# Patient Record
Sex: FEMALE | Race: WHITE | URBAN - METROPOLITAN AREA
[De-identification: names, ages, dates, MRNs, and addresses within clinical notes are randomized per-mention and may not be internally consistent; named-entity substitution may affect disease eponyms.]

---

## 2017-06-07 LAB — RAPID GROUP A STREP (OHS): NEGATIVE

## 2022-04-11 ENCOUNTER — HISTORICAL (OUTPATIENT)
Dept: ADMINISTRATIVE | Facility: HOSPITAL | Age: 27
End: 2022-04-11

## 2022-04-28 VITALS
OXYGEN SATURATION: 98 % | HEIGHT: 66 IN | BODY MASS INDEX: 19.67 KG/M2 | SYSTOLIC BLOOD PRESSURE: 111 MMHG | WEIGHT: 122.38 LBS | DIASTOLIC BLOOD PRESSURE: 70 MMHG

## 2022-09-21 ENCOUNTER — HISTORICAL (OUTPATIENT)
Dept: ADMINISTRATIVE | Facility: HOSPITAL | Age: 27
End: 2022-09-21

## 2024-05-02 ENCOUNTER — OFFICE VISIT (OUTPATIENT)
Dept: URGENT CARE | Facility: CLINIC | Age: 29
End: 2024-05-02
Payer: COMMERCIAL

## 2024-05-02 VITALS
OXYGEN SATURATION: 99 % | HEIGHT: 60 IN | RESPIRATION RATE: 20 BRPM | SYSTOLIC BLOOD PRESSURE: 104 MMHG | BODY MASS INDEX: 23.95 KG/M2 | TEMPERATURE: 101 F | DIASTOLIC BLOOD PRESSURE: 69 MMHG | HEART RATE: 126 BPM | WEIGHT: 122 LBS

## 2024-05-02 DIAGNOSIS — J02.9 SORE THROAT: Primary | ICD-10-CM

## 2024-05-02 DIAGNOSIS — B27.90 INFECTIOUS MONONUCLEOSIS WITHOUT COMPLICATION, INFECTIOUS MONONUCLEOSIS DUE TO UNSPECIFIED ORGANISM: ICD-10-CM

## 2024-05-02 LAB
CTP QC/QA: YES
CTP QC/QA: YES
HETEROPH AB SER QL: POSITIVE
S PYO RRNA THROAT QL PROBE: NEGATIVE

## 2024-05-02 PROCEDURE — 87880 STREP A ASSAY W/OPTIC: CPT | Mod: QW,,, | Performed by: STUDENT IN AN ORGANIZED HEALTH CARE EDUCATION/TRAINING PROGRAM

## 2024-05-02 PROCEDURE — 86308 HETEROPHILE ANTIBODY SCREEN: CPT | Mod: QW,,, | Performed by: STUDENT IN AN ORGANIZED HEALTH CARE EDUCATION/TRAINING PROGRAM

## 2024-05-02 PROCEDURE — 99204 OFFICE O/P NEW MOD 45 MIN: CPT | Mod: S$GLB,,, | Performed by: STUDENT IN AN ORGANIZED HEALTH CARE EDUCATION/TRAINING PROGRAM

## 2024-05-02 RX ORDER — AMOXICILLIN AND CLAVULANATE POTASSIUM 875; 125 MG/1; MG/1
1 TABLET, FILM COATED ORAL EVERY 12 HOURS
Qty: 20 TABLET | Refills: 0 | Status: SHIPPED | OUTPATIENT
Start: 2024-05-02 | End: 2024-05-02 | Stop reason: ALTCHOICE

## 2024-05-02 RX ORDER — BENZOCAINE/MENTHOL 15 MG-10MG
1 LOZENGE MUCOUS MEMBRANE
Qty: 20 LOZENGE | Refills: 0 | Status: SHIPPED | OUTPATIENT
Start: 2024-05-02

## 2024-05-02 RX ORDER — METHYLPREDNISOLONE 4 MG/1
TABLET ORAL
Qty: 21 EACH | Refills: 0 | Status: SHIPPED | OUTPATIENT
Start: 2024-05-02 | End: 2024-05-23

## 2024-05-02 RX ORDER — DEXTROAMPHETAMINE SACCHARATE, AMPHETAMINE ASPARTATE MONOHYDRATE, DEXTROAMPHETAMINE SULFATE AND AMPHETAMINE SULFATE 5; 5; 5; 5 MG/1; MG/1; MG/1; MG/1
20 CAPSULE, EXTENDED RELEASE ORAL EVERY MORNING
COMMUNITY

## 2024-05-02 RX ORDER — ACETAMINOPHEN 500 MG
1000 TABLET ORAL
Status: COMPLETED | OUTPATIENT
Start: 2024-05-02 | End: 2024-05-02

## 2024-05-02 RX ADMIN — Medication 1000 MG: at 05:05

## 2024-05-02 NOTE — PROGRESS NOTES
Subjective:      Patient ID: Mindy Diez is a 28 y.o. female.    Vitals:  blood pressure is 128/85 and her pulse is 86. Her respiration is 18 and oxygen saturation is 96%.     Chief Complaint: Sore Throat    Sore Throat   This is a new problem. The current episode started today. Associated symptoms comments: Fever . Treatments tried: ibuprofen.       HENT:  Positive for sore throat.       Objective:     Physical Exam    Assessment:     No diagnosis found.    Plan:       There are no diagnoses linked to this encounter.

## 2024-05-02 NOTE — PROGRESS NOTES
Subjective:      Patient ID: Mindy Diez is a 28 y.o. female.    Vitals:  height is 5' (1.524 m) and weight is 55.3 kg (122 lb). Her oral temperature is 101.5 °F (38.6 °C) (abnormal). Her blood pressure is 104/69 and her pulse is 126 (abnormal). Her respiration is 20 and oxygen saturation is 99%.     Chief Complaint: Sore Throat    Patient is a 28-year-old female who presents to clinic for evaluation of sore throat.  Patient reports concern for strep throat.  Patient reports symptoms began yesterday however worse today.  Patient states has developed some fatigue and fever today.  Patient reports fever with a temperature max of 101.7° F.  Patient reports she has taken Motrin prior to arrival of clinic.  Patient reports mildly painful swallowing however has not had any voice change, drooling, or difficulty speaking.  Patient denies any unilateral neck swelling.  Patient denies any headaches or dizziness, body aches, rash, ear pain, nasal sinus congestion, chest pain or shortness a breath, cough, abdominal pain, nausea or vomiting, diarrhea, urinary symptoms, or change in mentation.        Constitution: Positive for fatigue and fever (Temperature max 101.7° F).   HENT:  Positive for sore throat (Painful swallowing). Negative for ear pain, drooling and congestion.    Neck: neck negative.   Cardiovascular: Negative.  Negative for chest pain and palpitations.   Eyes: Negative.    Respiratory: Negative.  Negative for chest tightness, cough and shortness of breath.    Gastrointestinal: Negative.  Negative for abdominal pain, nausea, vomiting and diarrhea.   Endocrine: negative.   Genitourinary: Negative.  Negative for dysuria, frequency and urgency.   Musculoskeletal: Negative.    Skin: Negative.  Negative for color change, pale, rash and erythema.   Allergic/Immunologic: Negative.    Neurological: Negative.  Negative for dizziness, light-headedness, passing out, headaches, disorientation and altered mental status.    Hematologic/Lymphatic: Negative.    Psychiatric/Behavioral: Negative.  Negative for altered mental status, disorientation and confusion.       Objective:     Physical Exam   Constitutional: She is oriented to person, place, and time. She appears well-developed. She is cooperative.  Non-toxic appearance. She does not appear ill. No distress.   HENT:   Head: Normocephalic and atraumatic.   Ears:   Right Ear: Hearing, tympanic membrane, external ear and ear canal normal.   Left Ear: Hearing, tympanic membrane, external ear and ear canal normal.   Nose: Nose normal. No mucosal edema, rhinorrhea, nasal deformity or congestion. No epistaxis. Right sinus exhibits no maxillary sinus tenderness and no frontal sinus tenderness. Left sinus exhibits no maxillary sinus tenderness and no frontal sinus tenderness.   Mouth/Throat: Uvula is midline and mucous membranes are normal. Mucous membranes are moist. No trismus in the jaw. Normal dentition. No uvula swelling. Oropharyngeal exudate present. No posterior oropharyngeal erythema. Tonsils are 1+ on the right. Tonsils are 1+ on the left. Tonsillar exudate.   Eyes: Conjunctivae and lids are normal. Pupils are equal, round, and reactive to light. Right eye exhibits no discharge. Left eye exhibits no discharge. No scleral icterus.   Neck: Trachea normal and phonation normal. Neck supple. No neck rigidity present.   Cardiovascular: Regular rhythm, normal heart sounds and normal pulses. Tachycardia present.   Pulmonary/Chest: Effort normal and breath sounds normal. No respiratory distress. She has no wheezes. She has no rhonchi. She has no rales.   Abdominal: Normal appearance and bowel sounds are normal. She exhibits no distension. Soft. There is no abdominal tenderness.   Musculoskeletal: Normal range of motion.         General: Normal range of motion.      Cervical back: She exhibits no tenderness.   Lymphadenopathy:     She has cervical adenopathy.   Neurological: She is alert  and oriented to person, place, and time. She exhibits normal muscle tone.   Skin: Skin is warm, dry, intact, not diaphoretic, not pale and no rash. Capillary refill takes less than 2 seconds. No erythema   Psychiatric: Her speech is normal and behavior is normal. Judgment and thought content normal.   Nursing note and vitals reviewed.      Assessment:     1. Sore throat    2. Infectious mononucleosis without complication, infectious mononucleosis due to unspecified organism        Plan:       Sore throat  -     POCT rapid strep A  -     Strep A culture, throat  -     POCT Infectious mononucleosis antibody    Infectious mononucleosis without complication, infectious mononucleosis due to unspecified organism    Other orders  -     acetaminophen tablet 1,000 mg  -     Discontinue: amoxicillin-clavulanate 875-125mg (AUGMENTIN) 875-125 mg per tablet; Take 1 tablet by mouth every 12 (twelve) hours. for 10 days  Dispense: 20 tablet; Refill: 0  -     benzocaine-menthoL (CHLORASEPTIC MAX) 15-10 mg Lozg; 1 lozenge by Mucous Membrane route every 2 (two) hours as needed (Sore throat).  Dispense: 20 lozenge; Refill: 0  -     methylPREDNISolone (MEDROL DOSEPACK) 4 mg tablet; use as directed  Dispense: 21 each; Refill: 0                Labs:  Rapid strep negative.  Mono positive.  Throat culture collected; will call results.  Tylenol 1 g by mouth in clinic for fever.  Patient tolerated well.  No complications noted.  Repeat temperature 100.7° F.  Take medications as prescribed.  Tylenol/Motrin per package instructions for any pain or fever.  Recommend warm salt water gargles every 2-3 hours while awake.  Follow-up with PCP in 1-2 days.  Follow-up ENT as needed.  Return to clinic as needed.  To ED for any new or acutely worsening symptoms.  Patient in agreement with plan of care.    DISCLAIMER: Please note that my documentation in this Electronic Healthcare Record was produced using speech recognition software and therefore may  contain errors related to that software system.These could include grammar, punctuation and spelling errors or the inclusion/exclusion of phrases that were not intended. Garbled syntax, mangled pronouns, and other bizarre constructions may be attributed to that software system.

## 2024-05-02 NOTE — LETTER
May 2, 2024      [facility]  [address]  [facility] [address]  Phone: [phone]       Patient: Mindy Diez   YOB: 1995  Date of Visit: 05/02/2024    To Whom It May Concern:    BANDAR Diez  was at [facility] on 05/02/2024. The patient may return to work/school on 05/06/2024 with no restrictions. If you have any questions or concerns, or if I can be of further assistance, please do not hesitate to contact me.    Sincerely,    Americo Logan, NP

## 2024-05-07 ENCOUNTER — TELEPHONE (OUTPATIENT)
Dept: URGENT CARE | Facility: CLINIC | Age: 29
End: 2024-05-07
Payer: COMMERCIAL

## 2024-05-07 LAB — S PYO THROAT QL CULT: NEGATIVE

## 2024-10-09 ENCOUNTER — OFFICE VISIT (OUTPATIENT)
Dept: URGENT CARE | Facility: CLINIC | Age: 29
End: 2024-10-09
Payer: COMMERCIAL

## 2024-10-09 VITALS
RESPIRATION RATE: 18 BRPM | BODY MASS INDEX: 23.95 KG/M2 | WEIGHT: 122 LBS | OXYGEN SATURATION: 98 % | HEIGHT: 60 IN | SYSTOLIC BLOOD PRESSURE: 116 MMHG | DIASTOLIC BLOOD PRESSURE: 66 MMHG | HEART RATE: 102 BPM | TEMPERATURE: 98 F

## 2024-10-09 DIAGNOSIS — L60.0 INGROWN LEFT BIG TOENAIL: Primary | ICD-10-CM

## 2024-10-09 DIAGNOSIS — L03.032 PARONYCHIA OF GREAT TOE, LEFT: ICD-10-CM

## 2024-10-09 PROCEDURE — 99213 OFFICE O/P EST LOW 20 MIN: CPT | Mod: S$GLB,,, | Performed by: NURSE PRACTITIONER

## 2024-10-09 RX ORDER — MUPIROCIN 20 MG/G
OINTMENT TOPICAL 3 TIMES DAILY
Qty: 22 G | Refills: 0 | Status: SHIPPED | OUTPATIENT
Start: 2024-10-09 | End: 2024-10-16

## 2024-10-09 RX ORDER — CEPHALEXIN 500 MG/1
500 CAPSULE ORAL EVERY 6 HOURS
Qty: 28 CAPSULE | Refills: 0 | Status: SHIPPED | OUTPATIENT
Start: 2024-10-09 | End: 2024-10-16

## 2024-10-09 NOTE — PATIENT INSTRUCTIONS
Increase clear fluid intake  Start Keflex and take it as prescribed.  Take each dose with a little food to limit GI upset  Keep wound clean and covered until healed  Wash wound with clean, soapy water and change dressing daily  Apply mupirocin ointment 2-3 x daily and with dressing changes  Follow up with PCP and podiatry referral.  If you have not heard from Podiatry within 48 hours please contact this clinic for further instructions  Go directly to the er for any worsening, or emergent concerns  Return to clinic for new concerns.

## 2024-10-09 NOTE — PROGRESS NOTES
Subjective:      Patient ID: Mindy Diez is a 29 y.o. female.    Vitals:  height is 5' (1.524 m) and weight is 55.3 kg (122 lb). Her oral temperature is 97.8 °F (36.6 °C). Her blood pressure is 116/66 and her pulse is 102. Her respiration is 18 and oxygen saturation is 98%.     Chief Complaint: Toe Pain    29-year-old female seen today for left great toe pain.  She states that she received a pedicure about 2 weeks ago and thinks her toe maybe infected.  States the toe has become progressively more red and painful.  She has been keeping a bandage on it and states it has also started to drain more.    Toe Pain   Incident onset: x 2 weeks. Incident location: nail salon. The pain is present in the left toes. The pain is at a severity of 2/10. The pain is mild.       Constitution: Negative for chills and fever.   Cardiovascular:  Negative for chest pain, palpitations and sob on exertion.   Respiratory:  Negative for shortness of breath.    Gastrointestinal:  Negative for nausea and vomiting.   Musculoskeletal:  Positive for pain and trauma.   Skin:  Positive for wound and erythema.   Neurological:  Negative for dizziness, light-headedness and passing out.      Objective:     Physical Exam   Constitutional: She is oriented to person, place, and time. She appears well-developed. She is cooperative.  Non-toxic appearance. She does not appear ill. No distress.   HENT:   Head: Normocephalic and atraumatic.   Ears:   Right Ear: External ear normal.   Left Ear: External ear normal.   Nose: Nose normal.   Mouth/Throat: Oropharynx is clear and moist and mucous membranes are normal. Mucous membranes are moist.   Eyes: Conjunctivae and lids are normal. No scleral icterus.   Neck: Trachea normal and phonation normal. Neck supple.   Cardiovascular: Normal rate, regular rhythm, normal heart sounds and normal pulses.   Pulses:       Dorsalis pedis pulses are 2+ on the left side.      Comments: Left foot pink warm and dry.  Capillary  refill less 3 seconds   Pulmonary/Chest: Effort normal and breath sounds normal. No stridor. No respiratory distress.   Abdominal: Normal appearance.   Musculoskeletal:         General: No deformity.        Feet:    Neurological: no focal deficit. She is alert and oriented to person, place, and time. She has normal strength and normal reflexes. No sensory deficit.   Skin: Skin is warm, dry, intact and not diaphoretic. Capillary refill takes 2 to 3 seconds. erythema   Psychiatric: Her speech is normal and behavior is normal. Judgment and thought content normal.   Nursing note and vitals reviewed.      Assessment:     1. Ingrown left big toenail    2. Paronychia of great toe, left        Plan:       Ingrown left big toenail  -     mupirocin (BACTROBAN) 2 % ointment; Apply topically 3 (three) times daily. for 7 days  Dispense: 22 g; Refill: 0  -     Ambulatory referral/consult to Podiatry    Paronychia of great toe, left  -     cephALEXin (KEFLEX) 500 MG capsule; Take 1 capsule (500 mg total) by mouth every 6 (six) hours. for 7 days  Dispense: 28 capsule; Refill: 0    The physical exam findings were discussed with the patient and all questions answered. We discussed symptom monitoring, conservative care methods, medication use, and follow up orders. she verbalized understanding and agreement with the plan of care.

## 2024-10-22 ENCOUNTER — OFFICE VISIT (OUTPATIENT)
Dept: PODIATRY | Facility: CLINIC | Age: 29
End: 2024-10-22
Payer: COMMERCIAL

## 2024-10-22 VITALS — HEIGHT: 60 IN | WEIGHT: 121.94 LBS | BODY MASS INDEX: 23.94 KG/M2

## 2024-10-22 DIAGNOSIS — L60.0 INGROWN NAIL OF GREAT TOE OF RIGHT FOOT: Primary | ICD-10-CM

## 2024-10-22 DIAGNOSIS — L60.0 INGROWN NAIL OF GREAT TOE OF LEFT FOOT: ICD-10-CM

## 2024-10-22 PROCEDURE — 99999 PR PBB SHADOW E&M-EST. PATIENT-LVL III: CPT | Mod: PBBFAC,,, | Performed by: PODIATRIST

## 2024-10-22 PROCEDURE — 1159F MED LIST DOCD IN RCRD: CPT | Mod: CPTII,S$GLB,, | Performed by: PODIATRIST

## 2024-10-22 PROCEDURE — 99202 OFFICE O/P NEW SF 15 MIN: CPT | Mod: S$GLB,,, | Performed by: PODIATRIST

## 2024-10-22 PROCEDURE — 1160F RVW MEDS BY RX/DR IN RCRD: CPT | Mod: CPTII,S$GLB,, | Performed by: PODIATRIST

## 2024-10-22 PROCEDURE — 3008F BODY MASS INDEX DOCD: CPT | Mod: CPTII,S$GLB,, | Performed by: PODIATRIST

## 2024-10-22 RX ORDER — MUPIROCIN 20 MG/G
OINTMENT TOPICAL 3 TIMES DAILY
COMMUNITY
Start: 2024-10-09

## 2024-11-08 NOTE — PROGRESS NOTES
Subjective:      Patient ID: Mindy Diez is a 29 y.o. female.    Chief Complaint: Ingrown Toenail    Mindy is a 29 y.o. female who presents to the clinic complaining of painful ingrown toenail on both feet great toes. Pain with pressure and shoe wear. No drainage or redness noted. Tried to cut it out but still hurts    Review of Systems   Constitutional: Negative for chills and fever.   Cardiovascular:  Negative for claudication and leg swelling.   Respiratory:  Negative for shortness of breath.    Skin:  Positive for nail changes. Negative for itching and rash.   Musculoskeletal:  Negative for muscle cramps, muscle weakness and myalgias.   Gastrointestinal:  Negative for nausea and vomiting.   Neurological:  Negative for focal weakness, loss of balance, numbness and paresthesias.           Objective:      Physical Exam  Constitutional:       General: She is not in acute distress.     Appearance: She is well-developed. She is not diaphoretic.   Cardiovascular:      Pulses:           Dorsalis pedis pulses are 2+ on the right side and 2+ on the left side.        Posterior tibial pulses are 2+ on the right side and 2+ on the left side.      Comments: < 3 sec capillary refill time to toes 1-5 bilateral. Toes and feet are warm to touch proximally with normal distal cooling b/l. There is some hair growth on the feet and toes b/l. There is no edema b/l. No spider veins or varicosities present b/l.     Musculoskeletal:      Comments: Equinus noted b/l ankles with < 10 deg DF noted. MMT 5/5 in DF/PF/Inv/Ev resistance with no reproduction of pain in any direction. Passive range of motion of ankle and pedal joints is painless b/l.     Skin:     General: Skin is warm and dry.      Coloration: Skin is not pale.      Findings: No abrasion, bruising, burn, ecchymosis, erythema, laceration, lesion, petechiae or rash.      Nails: There is no clubbing.      Comments: Skin temperature, texture and turgor within normal  limits.    medial hallux nail margin of both feet with ingrown nail plate. No Surrounding erythema and minimal edema is noted there is no granuloma formation noted. No drainage or malodor       Neurological:      Mental Status: She is alert and oriented to person, place, and time.      Sensory: No sensory deficit.      Motor: No tremor, atrophy or abnormal muscle tone.      Comments: Negative tinel sign bilateral.   Psychiatric:         Behavior: Behavior normal.               Assessment:       Encounter Diagnoses   Name Primary?    Ingrown nail of great toe of right foot Yes    Ingrown nail of great toe of left foot          Plan:       Mindy was seen today for ingrown toenail.    Diagnoses and all orders for this visit:    Ingrown nail of great toe of right foot    Ingrown nail of great toe of left foot      I counseled the patient on her conditions, their implications and medical management.    Utilizing sterile toenail clippers I aggressively debrided the offending nail border approximately 3 mm from its edge and carried the nail plate incision down at an angle in order to wedge out the offending cryptotic portion of the nail plate. The offending border was then removed in toto.  No blood was drawn. Patient tolerated the procedure well and related significant relief.    We can set up a procedure if the pain persists to permanently remove the ingrown nail portion    Hua Villagran DPM

## 2024-11-30 ENCOUNTER — OFFICE VISIT (OUTPATIENT)
Dept: URGENT CARE | Facility: CLINIC | Age: 29
End: 2024-11-30
Payer: COMMERCIAL

## 2024-11-30 VITALS
WEIGHT: 123 LBS | OXYGEN SATURATION: 98 % | HEIGHT: 65 IN | BODY MASS INDEX: 20.49 KG/M2 | DIASTOLIC BLOOD PRESSURE: 70 MMHG | HEART RATE: 98 BPM | TEMPERATURE: 98 F | SYSTOLIC BLOOD PRESSURE: 108 MMHG

## 2024-11-30 DIAGNOSIS — J02.9 SORE THROAT: Primary | ICD-10-CM

## 2024-11-30 DIAGNOSIS — R05.1 ACUTE COUGH: ICD-10-CM

## 2024-11-30 DIAGNOSIS — R09.82 POST-NASAL DRIP: ICD-10-CM

## 2024-11-30 DIAGNOSIS — R06.2 WHEEZE: ICD-10-CM

## 2024-11-30 LAB
CTP QC/QA: YES
CTP QC/QA: YES
S PYO RRNA THROAT QL PROBE: NEGATIVE
SARS-COV-2 AG RESP QL IA.RAPID: NEGATIVE

## 2024-11-30 PROCEDURE — 87880 STREP A ASSAY W/OPTIC: CPT | Mod: QW,,, | Performed by: NURSE PRACTITIONER

## 2024-11-30 PROCEDURE — 99214 OFFICE O/P EST MOD 30 MIN: CPT | Mod: S$GLB,,, | Performed by: NURSE PRACTITIONER

## 2024-11-30 PROCEDURE — 87811 SARS-COV-2 COVID19 W/OPTIC: CPT | Mod: QW,S$GLB,, | Performed by: NURSE PRACTITIONER

## 2024-11-30 RX ORDER — ALBUTEROL SULFATE 1.25 MG/3ML
1.25 SOLUTION RESPIRATORY (INHALATION) EVERY 6 HOURS PRN
Qty: 75 ML | Refills: 0 | Status: SHIPPED | OUTPATIENT
Start: 2024-11-30 | End: 2025-11-30

## 2024-11-30 RX ORDER — PROMETHAZINE HYDROCHLORIDE AND DEXTROMETHORPHAN HYDROBROMIDE 6.25; 15 MG/5ML; MG/5ML
5 SYRUP ORAL EVERY 4 HOURS PRN
Qty: 120 ML | Refills: 0 | Status: SHIPPED | OUTPATIENT
Start: 2024-11-30 | End: 2024-12-07

## 2024-11-30 NOTE — PROGRESS NOTES
"Subjective:      Patient ID: Mindy Diez is a 29 y.o. female.    Vitals:  height is 5' 5" (1.651 m) and weight is 55.8 kg (123 lb). Her oral temperature is 98.4 °F (36.9 °C). Her blood pressure is 108/70 and her pulse is 98. Her oxygen saturation is 98%.     Chief Complaint: Sore Throat    Sore Throat   The current episode started in the past 7 days. Associated symptoms include coughing. Associated symptoms comments: Muscle aches, fatigue    . Treatments tried: nebulizer, inhaler.       HENT:  Positive for sore throat.    Respiratory:  Positive for cough.       Objective:     Physical Exam    Assessment:     1. Sore throat    2. Post-nasal drip    3. Acute cough    4. Wheeze        Plan:       Sore throat  -     SARS Coronavirus 2 Antigen, POCT Manual Read  -     POCT rapid strep A    Post-nasal drip    Acute cough    Wheeze  -     NEBULIZER FOR HOME USE    Other orders  -     albuterol (ACCUNEB) 1.25 mg/3 mL Nebu; Take 3 mLs (1.25 mg total) by nebulization every 6 (six) hours as needed (wheeze). Rescue  Dispense: 75 mL; Refill: 0  -     promethazine-dextromethorphan (PROMETHAZINE-DM) 6.25-15 mg/5 mL Syrp; Take 5 mLs by mouth every 4 (four) hours as needed (night time cough).  Dispense: 120 mL; Refill: 0                  Negative covid and flu  Wheeze and history of childhood asthma. No wheeze on exam. Consider exacerbation and Rx as above  Patient Instructions   Claritin(loratadine), Allegra(fexofenadine), or zyrtec(cetirizine) for runny nose, post nasal drip, and congestion.   Flonase nasal spray daily in the morning.     "

## 2024-11-30 NOTE — PATIENT INSTRUCTIONS
Claritin(loratadine), Allegra(fexofenadine), or zyrtec(cetirizine) for runny nose, post nasal drip, and congestion.   Flonase nasal spray daily in the morning.

## 2025-04-03 ENCOUNTER — PATIENT MESSAGE (OUTPATIENT)
Dept: ADMINISTRATIVE | Facility: OTHER | Age: 30
End: 2025-04-03
Payer: COMMERCIAL

## 2025-04-03 ENCOUNTER — OFFICE VISIT (OUTPATIENT)
Dept: OBSTETRICS AND GYNECOLOGY | Facility: CLINIC | Age: 30
End: 2025-04-03
Payer: COMMERCIAL

## 2025-04-03 VITALS — SYSTOLIC BLOOD PRESSURE: 96 MMHG | DIASTOLIC BLOOD PRESSURE: 64 MMHG | BODY MASS INDEX: 20.91 KG/M2 | WEIGHT: 125.69 LBS

## 2025-04-03 DIAGNOSIS — Z30.09 FAMILY PLANNING: Primary | ICD-10-CM

## 2025-04-03 DIAGNOSIS — Z80.3 FAMILY HISTORY OF BREAST CANCER: ICD-10-CM

## 2025-04-03 DIAGNOSIS — N64.52 BILATERAL NIPPLE DISCHARGE: ICD-10-CM

## 2025-04-03 PROCEDURE — 1159F MED LIST DOCD IN RCRD: CPT | Mod: CPTII,S$GLB,, | Performed by: GENERAL PRACTICE

## 2025-04-03 PROCEDURE — 99204 OFFICE O/P NEW MOD 45 MIN: CPT | Mod: S$GLB,,, | Performed by: GENERAL PRACTICE

## 2025-04-03 PROCEDURE — 3008F BODY MASS INDEX DOCD: CPT | Mod: CPTII,S$GLB,, | Performed by: GENERAL PRACTICE

## 2025-04-03 PROCEDURE — 3078F DIAST BP <80 MM HG: CPT | Mod: CPTII,S$GLB,, | Performed by: GENERAL PRACTICE

## 2025-04-03 PROCEDURE — 99999 PR PBB SHADOW E&M-EST. PATIENT-LVL III: CPT | Mod: PBBFAC,,, | Performed by: GENERAL PRACTICE

## 2025-04-03 PROCEDURE — 3074F SYST BP LT 130 MM HG: CPT | Mod: CPTII,S$GLB,, | Performed by: GENERAL PRACTICE

## 2025-04-03 NOTE — PATIENT INSTRUCTIONS
"PRENATAL VITAMINS    We recommend taking a prenatal vitamin because almost no one has a perfect diet that will meet all the needs of their pregnancy.  The vitamin should contain:  Folic acid (600 micrograms per day)  Iron (27 milligrams per day)  Omega-3 Fatty Acids (200 milligrams of DHA per day)    Be careful with vitamins and supplements because they are not regulated by the FDA.  Too much of some vitamins (for example, Vitamin A) can cause birth defects.  Also, many supplements contain fillers that might be harmful, and not everything that is natural is good for you (especially in pregnancy).       GENERAL BLOOD TESTS:  Many parts of a blood test can be flagged as "abnormal" by the system, but based on your age and other factors, it might be considered normal.    If your test is normal, and no follow-up is needed, you will not get a message from me.    If your test is abnormal, I typically will send you a message in [facility] with recommendations (starting a medication, repeating the test, checking other tests, etc.).  Sometimes one of my staff members or I will call you.     "

## 2025-04-03 NOTE — PROGRESS NOTES
ASSESSMENT AND PLAN   2025  29 y.o.  for establishing care, preconceptual counseling, family history of breast and ovarian cancers, and bilateral milky nipple discharge.    Basic fertility reviewed with the patient.  Discussed importance of PNV (info in AVS, already has one at home), optimizing health conditions (consider managing ADHD with diet/exercise as she sometimes does), medication management (Adderall not known to cause birth defects but can cause FGR), immunization status (will check Varicella and Rubella titers), and timing of intercourse plus use of BBT or home OPK's.  Written information provided and reviewed together.  Cervical Cancer screening: next cervical CA screen (PAP) due DEC 2025  HPV Vaccine: complete  Reassured that nipple discharge very unlikely to be malignant.  Normal exam today except for discharge.  Will check labs and determine next steps.  Genetics referral placed for counseling re family history.  Advised to inquire if any family members had genetic testing done.  Return to clinic:  in DEC for PAP and follow-up, sooner prn    Leslie L Weeks, MD [facility]    SUBJECTIVE   2025  Mindy Diez is a 29 y.o. here for establishing care and with questions about getting pregnant as well as nipple discharge and family history of breast and ovarian cancers.  Stopped [facility] 1-2yrs ago to see how her body would be off birth control.  Has been using withdrawal method so not really trying to conceive.  Nipple discharge, milky white, both sides, only comes out if expressed.  First noticed b/c she saw and squeezed what looked like a moody.  For past 2yrs.  Never bloody.  No other breast abnormalities.  No atypical headaches or vision changes.    G'sP's:   LMP: Patient's last menstrual period was 2025 (exact date).  Relationship:  3yrs and sexually active  Contraception: nothing (desires pregnancy)  PAP Hx: no h/o abnormals  LAST PAP: 2022:  PAP neg   HPV Vaccine: complete     OBJECTIVE   PHYSICAL EXAM  Vitals:    25 0941   BP: 96/64     GEN = alert/oriented, nad, pleasant  HEENT = sclera anicteric, EOM grossly normal  BREASTS = nontender, no suspicious masses palpated, no suspicious skin changes, easily able to elicit milky discharge bilaterally  CV = BP and HR as per vitals  PULM = normal respiratory effort   = deferred, no concerns    HISTORY   Date taken or verified: 2025     GYNECOLOGIC HISTORY  PAP Hx: no h/o abnormals  Genital HSV: No  Other STD Hx: No    Menarche: 16yoa  Bleeding -- #Days Bleedin / # Heavy Days: 1 / Product Change on heavy days: twice / Intermenstrual Bleeding: No / Cycle: regular every 28-30 days  Pain -- Dysmenorrhea: typical or expected menstrual cramps / Non-menstrual pelvic pain: No / Dyspareunia: denies  Other -- Vasomotor Sxs: denies / Vaginal dryness: denies    OBSTETRIC HISTORY  G0    SOCIAL HISTORY  Lives with:   Smoker: non-smoker / Alcohol: denies / Drugs: denies  Domestic Violence: No  Occupation:  [employer]     FAMILY HISTORY  BLEEDING or CLOTTING DISORDERS: none  BREAST CA: paternal aunt x 2 and paternal cousin - daughter of her dad's brother  / UTERINE CA: none / OVARIAN CA: maternal aunt  COLON CA: none     PAST MEDICAL HISTORY  -------------------------------------    ADHD    Childhood asthma     PAST SURGICAL HISTORY  ----------------------------    Removal, foreign body    from knee age 12     ALLERGIES  Review of patient's allergies indicates:  No Known Allergies    MEDICATIONS  Current Outpatient Medications   Medication Instructions    dextroamphetamine-amphetamine (ADDERALL XR) 20 MG 24 hr capsule 20 mg, Every morning         New patient: In excess of 45 minutes total time spent for evaluation and management services. Time included elements of the following: time spent preparing to see patient, obtaining and reviewing separately obtained history, exam, evaluation,  counseling and education of patient/family member or care giver, documenting in the HMR, independently interpreting results and communication of results, coordination of care ordering medications, tests, or procedures, referral and communication to other health care professionals.

## 2025-04-04 ENCOUNTER — LAB VISIT (OUTPATIENT)
Dept: LAB | Facility: HOSPITAL | Age: 30
End: 2025-04-04
Attending: GENERAL PRACTICE
Payer: COMMERCIAL

## 2025-04-04 DIAGNOSIS — N64.52 BILATERAL NIPPLE DISCHARGE: ICD-10-CM

## 2025-04-04 LAB
B-HCG UR QL: NEGATIVE
ESTRADIOL SERPL HS-MCNC: 41 PG/ML
FSH SERPL-ACNC: 6.25 MIU/ML
LH SERPL-ACNC: 3.6 MIU/ML
PROLACTIN SERPL IA-MCNC: 15 NG/ML (ref 5.2–26.5)
TESTOST SERPL-MCNC: 27 NG/DL (ref 5–73)
TSH SERPL-ACNC: 1.56 UIU/ML (ref 0.4–4)

## 2025-04-04 PROCEDURE — 82670 ASSAY OF TOTAL ESTRADIOL: CPT

## 2025-04-04 PROCEDURE — 84403 ASSAY OF TOTAL TESTOSTERONE: CPT

## 2025-04-04 PROCEDURE — 81025 URINE PREGNANCY TEST: CPT

## 2025-04-04 PROCEDURE — 36415 COLL VENOUS BLD VENIPUNCTURE: CPT | Mod: PO

## 2025-04-04 PROCEDURE — 84146 ASSAY OF PROLACTIN: CPT

## 2025-04-04 PROCEDURE — 83002 ASSAY OF GONADOTROPIN (LH): CPT

## 2025-04-04 PROCEDURE — 86762 RUBELLA ANTIBODY: CPT

## 2025-04-04 PROCEDURE — 84443 ASSAY THYROID STIM HORMONE: CPT

## 2025-04-04 PROCEDURE — 83498 ASY HYDROXYPROGESTERONE 17-D: CPT

## 2025-04-04 PROCEDURE — 86787 VARICELLA-ZOSTER ANTIBODY: CPT

## 2025-04-04 PROCEDURE — 83001 ASSAY OF GONADOTROPIN (FSH): CPT

## 2025-04-07 LAB
RUBV IGG SER-ACNC: 31.2 IU/ML
RUBV IGG SER-IMP: REACTIVE

## 2025-04-08 LAB
V.ZOSTER IGG INTERP (OHS): NEGATIVE
VARICELLA ZOSTER IGG (OHS): 0.25 S/CO

## 2025-04-10 ENCOUNTER — RESULTS FOLLOW-UP (OUTPATIENT)
Dept: OBSTETRICS AND GYNECOLOGY | Facility: CLINIC | Age: 30
End: 2025-04-10

## 2025-04-10 LAB — W 17-ALPHA HYDROXYPROGESTERONE: 36 NG/DL

## 2025-04-10 NOTE — PROGRESS NOTES
Mindy,    Hormones are all normal.  You do not have immunity to the Varicella (chicken pox) virus.  I encourage you to consider getting the vaccine before trying to get pregnant.    Getting the virus during pregnancy can be devastating for the baby, and you can't get the vaccine during pregnancy.  You shouldn't get pregnant for 4 weeks after you get the vaccine.    Someone from the office can let you know of a few local pharmacies that have the vaccine.    Sincerely,  Dr. Craven

## 2025-04-11 ENCOUNTER — TELEPHONE (OUTPATIENT)
Dept: OBSTETRICS AND GYNECOLOGY | Facility: CLINIC | Age: 30
End: 2025-04-11
Payer: COMMERCIAL

## 2025-04-11 NOTE — TELEPHONE ENCOUNTER
----- Message from Lauren Craven MD sent at 4/10/2025  1:00 PM CDT -----  Mindy,    Hormones are all normal.  You do not have immunity to the Varicella (chicken pox) virus.  I encourage you to consider getting the vaccine before trying to get pregnant.    Getting the virus during pregnancy can be devastating for the baby, and you can't get the vaccine during pregnancy.  You shouldn't get pregnant for 4 weeks after you get the vaccine.    Someone from the office can let you know of a few local pharmacies that have the vaccine.    Sincerely,  Dr. Craven  ----- Message -----  From: Lab, Background User  Sent: 4/4/2025   9:00 PM CDT  To: Lauren Craven MD

## 2025-04-11 NOTE — TELEPHONE ENCOUNTER
LVM to inform pt she can go to any [facility] in [place] or [employer] pharmacies for the Varicella vaccine. Advised to reach out to office for any questions.

## 2025-04-17 NOTE — PROGRESS NOTES
Cancer Genetics  Hereditary and High-Risk Clinic  Department of Hematology and Oncology  [facility]    Date of Service:  25  Visit Provider:  Chacha Persaud MS, [facility] [place]    Patient ID  Name: Mindy Diez    : 1995    MRN: [id number]      Referring Provider  Lauren Craven MD  [address] [place] [address]    Face-to-face time with patient:  Approximately 30 minutes.    Approximately 47 minutes in total were spent on the day of this encounter, which includes face-to-face time and non-face-to-face time preparing to see the patient (e.g., review of records and tests), obtaining and/or reviewing separately obtained history, documenting clinical information in the electronic or other health record, independently interpreting results (not separately reported) and communicating results to the patient/family/caregiver, or care coordination (not separately reported).      IMPRESSION      Mindy Diez is a pleasant 29 y.o. female patient seen in genetic counseling given her family history of breast and ovarian cancer. Mindy Diez was unaccompanied today. We discussed having multiple relatives on the same side of the family with breast cancer, with them being under age 50 at diagnosis is suspicious. There are many male relatives on her dad's side of the family which can make patterns more difficult to assess. The only cancer history of her maternal side is her aunt with ovarian cancer, whose genetic testing was negative. MINDY meets NCCN HBOPP criteria for genetic testing based on her paternal family history of breast cancer in 4 individuals, with all of them being under age 50 at diagnosis. She elected to proceed with the [facility] xG+ panel with RNA. A sample was collected today 2025.    FOCUSED PERSONAL HISTORY     Chief Complaint: Genetic Evaluation (Family history of breast and ovarian cancer)    History of Present Illness (HPI):  Mindy Diez  "("FLORENCIA"), 29 y.o., assigned female sex at birth, is new to the [facility] of [facility]ology and Oncology and to me.  She was referred by  OBGYN  for hereditary cancer risk assessment given her family history of breast cancer.    Cancer History  No personal history of cancer   No masses/tumors/lesions    Focused Medical History  Previous germline cancer genetic testing:  No  Colonoscopy: No  Mammogram: No  Breast MRI:  No  Pancreatitis:  No    Focused Surgical History  Reproductive organs:  Intact    Breast Cancer Risk Assessment Questionnaire  Age:  29 y.o.   Race and ethnicity:  White, Not  or /a  Weight:  125 lb  Height:  5'5"  Mammographic breast density:  unknown  Age at menarche:  15y  Age at first live childbirth:  N/A  Menopausal status:  premenopausal  Hormone replacement therapy use history:  N/A  OCPs: yes, in past  Breast biopsy history and findings:  N/A  Thoracic radiation therapy history:  N/A    Tobacco Use  Tobacco Use: Low Risk  (12/3/2024)    [facility]    Patient History     Smoking Tobacco Use: Never     Smokeless Tobacco Use: Never     Passive Exposure: Not on file       Review of Systems   Patient's Stress Score today was 3/10 (with 10 being the worst).      FAMILY HISTORY     Cancer Pedigree         Maternal:  Aunt with ovarian cancer in her 70s, negative genetic testing    Paternal:  2 aunts with breast cancer in their 40s  Grandmother with breast cancer in her 40s/50s  Cousin with breast cancer in her 30s    A family history of birth defects, intellectual disability, SIDS, sudden early death, multiple miscarriages and consanguinity were denied. Please refer to above pedigree for further details. A larger copy has been scanned in the Media tab.     DISCUSSION     Approximately 5-10% of breast cancers are due to hereditary causes. Things that make us suspicious of a hereditary cause of " cancer include the type of cancers seen in the family, number of people with cancer, ages of people with cancer, and certain cancer pathologies. The majority of hereditary breast cancers (>50%) are due to mutations in BRCA1 or BRCA2.  Around 12-30% are due to mutations in other highly penetrant genes, such as PALB2, PTEN and TP53, or in moderately penetrant genes, such as KOKI, BARD1, and CHEK2.  The remaining percentage are caused by unknown/unidentified genes. FLORENCIA meets NCCN criteria for genetic testing based on her paternal family history of breast cancer in 4 individuals, with all of them being under age 50 at diagnosis. Therefore, she was offered phenotype-driven and broad panel testing. FLORENCIA opted for the xG+ panel through 76 of the following  genes associated with hereditary breast, gastrointestinal, gynecologic, pancreatic, prostate, skin and other cancers:    76 genes:   AIP, ALK, APC, KOKI, AXIN2, BAP1, BARD1, BMPR1A, BRCA1, BRCA2, BRIP1, CDC73, CDH1, CDK4, CDKN1B, CDKN2A, CEBPA ,CHEK2, CTNNA1, DDX41, DICER1, EGFR, EPCAM, ETV6, FH, FLCN, GATA2, GREM1, HOXB13, KIT, LZTR1, MAX, MBD4, MEN1, MET, MITF, MLH1, MSH2, MSH3, MSH6, MUTYH, NF1, NF2, NTHL1, PALB2, PDGFRA, PHOX2B, PMS2, POLD1, POLE, POT1, GZAZE0S, PTCH1, PTEN, RAD51C, RAD51D, RB1, RET, RUNX1, SDHA, SDHAF2, SDHB, SDHC, SDHD, SMAD4, SMARCA4, SMARCB1, SMARCE1, STK11, SUFU, ENAY299, TP53, TSC1, TSC2, VHL, WT1.     We reviewed that mutations in the highly penetrant genes put an individual at a significantly increased risk of breast and other cancers.  There are established screening and surgery guidelines for these syndromes. Mutations in the moderately penetrant genes increase the risk of breast and other cancers, but less is understood regarding their role in cancer risk. There may not be standard screening or management guidelines for individuals who have mutations in these genes.    Furthermore, we discussed the psychosocial implications of a positive  result, including anxiety, fear and guilt if a mutation is passed on to a child. FLORENCIA did not express concern.    Possible Results:    Positive (pathogenic or likely pathogenic variant): A genetic variant was found that is suspected or known to impact the function of the gene. The impact of a positive result on an individual's risk of cancer varies based on the gene, specific variant, individual's sex assigned at birth, personal cancer history, other health history (such as surgical history), and family history. A positive result can impact screening and risk management recommendations. However, there are not always available guidelines for management based on a specific gene variant. Family history and personal risk factors should always be considered. Sometimes, a positive result can also have treatment or reproductive implications.   Negative: No clinically significant variants were reported in the tested genes. A negative result does not indicate that an individual cannot develop cancer or even that the individual is at average risk. An individual may still be at an increased risk for cancer based on personal risk factors or family history. Additionally, there could be a hereditary cancer predisposition that was not included in a chosen panel or is not detected with current technology.   Variant of Uncertain Significance (VUS): A variant was found. However, the lab does not have enough information to determine if the variant is benign (harmless) or pathogenic (impacts the function of the gene). The laboratory may update (reclassify) the variant over time as more information becomes available. When reclassified, most variants of uncertain significance are reclassified to benign/likely benign. Typically, it is not recommended to  based on the presence of a VUS. The chance of finding a VUS varies based on the test performed. Generally, the chance of finding a VUS increases with the number of genes  tested and decreases with the amount of testing of that gene (genes that are tested more frequently or for a longer period of time have a lower VUS rate).    Genetic Mutation Inheritance:  When an individual has a gene mutation, their first-degree relatives (parents, children, and siblings) each have a 50% chance of carrying the same mutation. Other, more distant blood relatives can also be at risk of carrying the same mutation. At-risk relatives of an individual with a mutation should consider genetic testing to help determine their risk for cancer.     Genetic Discrimination: The Genetic Information Nondiscrimination Act of 2008 (BRANDON) is a [place] federal law that provides some protections against the use of an individual's genetic information by their health insurer and by their employer. Title I of BRANDON prohibits most health insurers (except for insurance obtained through a job with the  or the Federal Employees Health Benefits Plan) from utilizing an individual's genetic information to make decisions regarding insurance eligibility or premium charges. Title II of BRANDON prohibits covered entities from requesting or requiring the genetic information of employees and applicants and from using said information to make employment decisions. This does not apply to employers with fewer than 15 employees or to the .  BRANDON also does not protect individuals from genetic discrimination by any other type of policy or entity, including but not limited to life insurance, disability insurance, long-term care insurance,  benefits, and [facility] benefits.    There is also a possibility for the patient to incur out-of-pocket costs related to this testing. FLORENCIA appeared to have a good understanding of the information as she asked appropriate questions.  FLORENCIA received comprehensive counseling regarding panel testing and has elected to proceed with this testing. A sample was scheduled to be  "submitted on 4/22/2025 to [facility]. MINDY's results should be available in approximately 3 weeks.  In the meantime, she is welcome to contact me if she has any questions, concerns, or updates to her family history.       ASSESSMENT / PLAN      Mindy Diez ("MINDY"), 29 y.o., presented today for hereditary cancer risk assessment and genetic counseling given her family history of breast and ovarian cancer:  Mindy meets NCCN HBOPP criteria for genetic testing based on her paternal family history of breast cancer in 4 individuals, with all of them being under age 50 at diagnosis.    Mindy is interested in broad panel testing, [facility] xG+ ordered  [facility] financial form filled out in clinic today  Sample collected today 4/22/2025  I will call her in ~3 weeks when her results are ready      ICD-10-CM ICD-9-CM   1. Family history of breast cancer  Z80.3 V16.3   2. Family history of ovarian cancer  Z80.41 V16.41   3. Encounter for nonprocreative genetic counseling  Z71.83 V26.33     1. Family history of breast cancer  - Ambulatory referral/consult to Genetics  - [facility] - Hereditary Cancer with RNA; Future    2. Family history of ovarian cancer  - [facility] - Hereditary Cancer with RNA; Future    3. Encounter for nonprocreative genetic counseling  - [facility] - Hereditary Cancer with RNA; Future       Genetic Test Information  Testing lab: [facility]   Test panel: xG+     ICD-10 code(s): Z80.3, Z80.41, Z71.83   Verbal informed consent: Obtained   Written informed consent: Obtained   Specimen type: Blood  (Patient denies blood disorders that would necessitate a skin fibroblast specimen)   Specimen collection by: [facility]   Specimen collection date: 04/22/2025   Results expected by: Approximately 2-3 weeks after the genetic testing lab receives the specimen   Results disclosure plan: Post-test visit if positive or complex result; otherwise, results will be communicated through phone call       Follow-up:  No follow-ups on " file.    Questions were encouraged and answered to the patient's satisfaction, and she verbalized understanding of the information and agreement with the plan.         Approximately 30 minutes were spent face-to-face with the patient.  Approximately 47 minutes in total were spent on the day of this encounter, which includes face-to-face time and non-face-to-face time preparing to see the patient (e.g., review of tests), obtaining and/or reviewing separately obtained history, documenting clinical information in the electronic or other health record, independently interpreting results (not separately reported) and communicating results to the patient/family/caregiver, or care coordination (not separately reported).     This assessment is based on the history and reports provided, as well as the current scientific knowledge regarding cancer genetics.         Chacha Persaud MS, [facility]  Genetic Counselor, Hereditary and High-Risk Clinic  Department of Hematology and Oncology  [facility]        CC:  Dr. Lauren Craven

## 2025-04-21 ENCOUNTER — PATIENT MESSAGE (OUTPATIENT)
Dept: HEMATOLOGY/ONCOLOGY | Facility: CLINIC | Age: 30
End: 2025-04-21
Payer: COMMERCIAL

## 2025-04-22 ENCOUNTER — LAB VISIT (OUTPATIENT)
Dept: LAB | Facility: HOSPITAL | Age: 30
End: 2025-04-22
Attending: INTERNAL MEDICINE
Payer: COMMERCIAL

## 2025-04-22 ENCOUNTER — OFFICE VISIT (OUTPATIENT)
Dept: HEMATOLOGY/ONCOLOGY | Facility: CLINIC | Age: 30
End: 2025-04-22
Payer: COMMERCIAL

## 2025-04-22 DIAGNOSIS — Z80.3 FAMILY HISTORY OF BREAST CANCER: Primary | ICD-10-CM

## 2025-04-22 DIAGNOSIS — Z80.3 FAMILY HISTORY OF BREAST CANCER: ICD-10-CM

## 2025-04-22 DIAGNOSIS — Z71.83 ENCOUNTER FOR NONPROCREATIVE GENETIC COUNSELING: ICD-10-CM

## 2025-04-22 DIAGNOSIS — Z80.41 FAMILY HISTORY OF OVARIAN CANCER: ICD-10-CM

## 2025-04-22 PROCEDURE — 96041 GENETIC COUNSELING SVC EA 30: CPT | Mod: S$GLB,,, | Performed by: BEHAVIOR TECHNICIAN

## 2025-04-22 PROCEDURE — 36415 COLL VENOUS BLD VENIPUNCTURE: CPT | Mod: PN

## 2025-04-22 PROCEDURE — 99499 UNLISTED E&M SERVICE: CPT | Mod: S$GLB,,, | Performed by: BEHAVIOR TECHNICIAN

## 2025-04-22 PROCEDURE — 99999 PR PBB SHADOW E&M-EST. PATIENT-LVL II: CPT | Mod: PBBFAC,,, | Performed by: BEHAVIOR TECHNICIAN

## 2025-05-03 LAB
GENE DIS ANL INTERP-IMP: NORMAL
TEMPUS GENES ANALYZED: NORMAL
TEMPUS INTERPRETATION REPORT: NORMAL
TEMPUS PORTAL: NORMAL

## 2025-05-05 ENCOUNTER — TELEPHONE (OUTPATIENT)
Dept: HEMATOLOGY/ONCOLOGY | Facility: CLINIC | Age: 30
End: 2025-05-05
Payer: COMMERCIAL

## 2025-05-05 DIAGNOSIS — Z91.89 AT HIGH RISK FOR BREAST CANCER: Primary | ICD-10-CM

## 2025-05-05 DIAGNOSIS — Z80.3 FAMILY HISTORY OF BREAST CANCER: ICD-10-CM

## 2025-05-05 NOTE — TELEPHONE ENCOUNTER
Impression    Mindy Diez's panel genetic testing was negative for actionable mutations in 76 genes associated with hereditary breast, gastrointestinal, gynecologic, pancreatic, prostate and skin cancers. Results were disclosed over the phone on 5/5/2025.    Discussion    Genetic Test Results    Mindy Diez had a sample submitted on 4/22/2025 to [facility] for xG+ with RNA panel testing. This panel includes sequencing and/or deletion/duplication analysis of the following 76 genes:    76 genes:   AIP, ALK, APC, KOKI, AXIN2, BAP1, BARD1, BMPR1A, BRCA1, BRCA2, BRIP1, CDC73, CDH1, CDK4, CDKN1B, CDKN2A, CEBPA ,CHEK2, CTNNA1, DDX41, DICER1, EGFR, EPCAM, ETV6, FH, FLCN, GATA2, GREM1, HOXB13, KIT, LZTR1, MAX, MBD4, MEN1, MET, MITF, MLH1, MSH2, MSH3, MSH6, MUTYH, NF1, NF2, NTHL1, PALB2, PDGFRA, PHOX2B, PMS2, POLD1, POLE, POT1, ADRUV5E, PTCH1, PTEN, RAD51C, RAD51D, RB1, RET, RUNX1, SDHA, SDHAF2, SDHB, SDHC, SDHD, SMAD4, SMARCA4, SMARCB1, SMARCE1, STK11, SUFU, CABQ423, TP53, TSC1, TSC2, VHL, WT1.    The results were negative for actionable mutations in any of these genes. This result reduces the chance that you could have a hereditary predisposition to cancer due to any of the tested genes. However, without a known hereditary predisposition in the family, interpretation of this negative result is limited as you do not have a personal history of cancer. Possible explanations include:   The cancer in the family may not be due to a hereditary cancer predisposition. Most cancer is not related to a hereditary cancer predisposition. However, having a family history of cancer may still impact the risk of cancer because of other shared factors like environment or lifestyle. You and your relatives should discuss the family history and any personal risk factors with a healthcare provider to determine an appropriate plan for cancer screening and risk reduction.   There is a pathogenic variant contributing to the family history of cancer,  but you did not inherit it. Your relatives should speak with their healthcare providers to determine if they may benefit from genetic testing.   You and/or your relatives could have a pathogenic variant in a gene that has not been associated with cancer or that was not tested.  You and/or your relatives could have a pathogenic variant that the current technology was not able to detect.    Cancer Risks    Breast Cancer Risk Stratification: Post-testing  Current, Estimated Breast Cancer Risk Model Used Patient's Score Patient's Risk Category   5-year Dinora Model   [x] N/A given age <35   [] Average risk (<1.7%)   [] Increased risk (>=1.7%)   10-year Tyrer-Cuzick v8.0b 0.64%  [x] <5%   [] >=5%    Lifetime (to age 85) Tyrer-Cuzick v8.0b 23.15%  [] Average risk (<15%)   [] Intermediate risk (>=15% - <20%)   [x] Increased risk (>=20%)      Your risk can change over time if there are any changes to your family history or personal risk factors, as well as just as you age. Your risk should be re-evaluated on a regular basis. Please note, this risk was generated using information you provided. Any missing, unknown, or inaccurate information may impact this risk.     [20+] National Comprehensive Cancer Network Guidelines recommend individuals with an estimated residual lifetime breast cancer risk of 20% or higher have the following:   Breast awareness - be familiar with your breasts and report any changes to your healthcare providers.   Clinical appointment every 6-12 months, preferably including a clinical breast exam.   Yearly mammogram with tomosynthesis starting by age 40* (but not before age 30).  Yearly breast MRI with and without contrast starting by age 40* (but not before age 25). For those who cannot have MRI, consider contrast-enhanced mammography (HARMONY) or molecular breast imaging (MBI). If these forms of imaging are not available, whole breast ultrasound may be done.   Consider risk reduction strategies.  Follow a  healthy lifestyle.   *Or 10 years prior to the earliest age of breast cancer in the family.     I placed her a referral to see a breast specialist due to her increased lifetime risk of breast cancer. Additionally, she has a cousin with breast cancer in her 30s, we early screening should be discussed. Mindy accepted and understands this referral      Family Members    The following family members could still consider genetic testing for themselves: Paternal relatives with breast cancer, siblings      Mindy Diez received comprehensive genetic counseling regarding her negative genetic test results. Benefits and limitations were discussed, and she was provided with an electronic copy of her results report. She is encouraged to contact cancer genetics if there are any updates to her personal or family history, or if she has any questions or concerns.    This assessment is based on the history and reports provided, as well as the current scientific knowledge regarding cancer genetics.

## 2025-05-06 ENCOUNTER — TELEPHONE (OUTPATIENT)
Facility: CLINIC | Age: 30
End: 2025-05-06
Payer: COMMERCIAL

## 2025-05-07 ENCOUNTER — TELEPHONE (OUTPATIENT)
Dept: OBSTETRICS AND GYNECOLOGY | Facility: CLINIC | Age: 30
End: 2025-05-07
Payer: COMMERCIAL

## 2025-05-07 NOTE — TELEPHONE ENCOUNTER
----- Message from Kaleigh sent at 5/7/2025  3:27 PM CDT -----  Sooner Appointment Request Caller is requesting a sooner appointment.  Caller declined first available appointment listed below.  Caller will not accept being placed on the waitlist and is requesting a message be sent to doctor.Name of Caller:FLORENCIA SIDDIQUI [[id number]]When is the first available appointment?N/ASymptoms:Pt just found out she pregnant.Would the patient rather a call back or a response via [facility]? CallBest Call Back Number:Telephone Information:Mobile          [phone] Information: Pt last cycle was 04/01/2025

## 2025-05-07 NOTE — TELEPHONE ENCOUNTER
Explained to pt pregnancy confirmations are scheduled between 8-10 weeks with NP. Appointment scheduled Tuesday 4/27 at 9 am; pt voiced understanding.

## 2025-05-08 ENCOUNTER — TELEPHONE (OUTPATIENT)
Facility: CLINIC | Age: 30
End: 2025-05-08
Payer: COMMERCIAL

## 2025-05-08 NOTE — TELEPHONE ENCOUNTER
Received referral from roseanne Burnham, for pt to be seen in our high risk breast clinic. Pt has a family history of breast cancer and ovarian cancer. Pt had a cousin recently diagnosed with breast cancer and she is only a couple years older than pt. Pt found out she was pregnant yesterday. Pt asking at what point can she start doing mammos. I told her I would speak with the NP and call her back. She verbalized understanding.

## 2025-05-13 ENCOUNTER — TELEPHONE (OUTPATIENT)
Facility: CLINIC | Age: 30
End: 2025-05-13
Payer: COMMERCIAL

## 2025-05-13 NOTE — TELEPHONE ENCOUNTER
Spoke with pt letting her know I spoke with JEANNE Worthington. Elin recommends pap smears, self exams. Once pt delivers baby she can call back to schedule an appt with high risk so we can start Mammograms.  She verbalized understanding.   ----- Message -----  From: Shellie Sin RN  Sent: 5/8/2025   1:20 PM CDT  To: Shellie Sin RN    Received referral from roseanne Burnham, for pt to be seen in our high risk breast clinic. Pt has a family history of breast cancer and ovarian cancer. Pt had a cousin recently diagnosed with breast cancer and she is only a couple years older than pt. Pt found out she was pregnant yesterday. Pt asking at what point can she start doing mammos. I told her I would speak with the NP and call her back. She verbalized understanding.

## 2025-05-27 ENCOUNTER — OFFICE VISIT (OUTPATIENT)
Dept: OBSTETRICS AND GYNECOLOGY | Facility: CLINIC | Age: 30
End: 2025-05-27
Payer: COMMERCIAL

## 2025-05-27 ENCOUNTER — HOSPITAL ENCOUNTER (OUTPATIENT)
Dept: OBSTETRICS AND GYNECOLOGY | Facility: CLINIC | Age: 30
Discharge: HOME | End: 2025-05-27
Payer: COMMERCIAL

## 2025-05-27 ENCOUNTER — RESULTS FOLLOW-UP (OUTPATIENT)
Dept: OBSTETRICS AND GYNECOLOGY | Facility: CLINIC | Age: 30
End: 2025-05-27

## 2025-05-27 VITALS — WEIGHT: 128.75 LBS | BODY MASS INDEX: 21.45 KG/M2 | HEIGHT: 65 IN

## 2025-05-27 DIAGNOSIS — Z32.00 ENCOUNTER FOR CONFIRMATION OF PREGNANCY TEST RESULT WITH PHYSICAL EXAMINATION: Primary | ICD-10-CM

## 2025-05-27 DIAGNOSIS — Z32.00 ENCOUNTER FOR CONFIRMATION OF PREGNANCY TEST RESULT WITH PHYSICAL EXAMINATION: ICD-10-CM

## 2025-05-27 LAB
B-HCG UR QL: POSITIVE
CTP QC/QA: YES

## 2025-05-27 PROCEDURE — 81025 URINE PREGNANCY TEST: CPT | Mod: S$GLB,,, | Performed by: FAMILY MEDICINE

## 2025-05-27 PROCEDURE — 87591 N.GONORRHOEAE DNA AMP PROB: CPT | Performed by: FAMILY MEDICINE

## 2025-05-27 PROCEDURE — 76801 OB US < 14 WKS SINGLE FETUS: CPT | Mod: 26,,, | Performed by: OBSTETRICS & GYNECOLOGY

## 2025-05-27 PROCEDURE — 3008F BODY MASS INDEX DOCD: CPT | Mod: CPTII,S$GLB,, | Performed by: FAMILY MEDICINE

## 2025-05-27 PROCEDURE — 99999 PR PBB SHADOW E&M-EST. PATIENT-LVL III: CPT | Mod: PBBFAC,,, | Performed by: FAMILY MEDICINE

## 2025-05-27 PROCEDURE — 99214 OFFICE O/P EST MOD 30 MIN: CPT | Mod: S$GLB,,, | Performed by: FAMILY MEDICINE

## 2025-05-27 PROCEDURE — 1159F MED LIST DOCD IN RCRD: CPT | Mod: CPTII,S$GLB,, | Performed by: FAMILY MEDICINE

## 2025-05-27 PROCEDURE — 1160F RVW MEDS BY RX/DR IN RCRD: CPT | Mod: CPTII,S$GLB,, | Performed by: FAMILY MEDICINE

## 2025-05-27 RX ORDER — DEXTROAMPHETAMINE SACCHARATE, AMPHETAMINE ASPARTATE, DEXTROAMPHETAMINE SULFATE AND AMPHETAMINE SULFATE 5; 5; 5; 5 MG/1; MG/1; MG/1; MG/1
20 TABLET ORAL
COMMUNITY
Start: 2025-05-20

## 2025-05-27 NOTE — PROGRESS NOTES
Based on your ultrasound, your final due date is 1/17/2026.    Your next office visit is on 6/12/25 with Dr. Craven as previously scheduled.     If you plan to have either or both of the optional genetic screening tests,we will order this as your next office visit.

## 2025-05-27 NOTE — PROGRESS NOTES
Mindy Diez  29 y.o.  MRN  [id number] PATIENT   1995   FINAL EDC:   ___   by ___    Baby: ___     Name: Anders ALLERGIES:    NKDA      GBS: ___  Date: ___ OB PROBLEM LIST:       TO-DO THROUGHOUT PREGNANCY:  Depression Screen: ___  Circumcision: ___  Rhogam: ___  Flu Shot: ___  TDAP: ___  Infant feeding: ___  Pre-registered: ___   Plans for epidural: ___  Classes at hospital: ___  PP contraception: ___  Delivery Consent: ___  TOLAC counseling: ___  BTL counseling: ___  Pediatrician: ___ MEDICATIONS:    PNV   TODAY'S PROGRESS NOTE    2025    OB INTAKE APPOINTMENT  Mindy Diez is a 29 y.o. who presents today for her OB Intake Appointment.    She denies any problems so far besides nausea.     PREGNANCY DATING:    Pregnancy test today = positive  LMP 2025 ---gives EDC---> 2026 ----> 8+0 wks EGA today    Sure LMP: Yes  Prior US done: No  Other information relevant to dating (sure conception date, IVF date, etc.): No     CARRIER SCREENING PREVIOUSLY DONE:  Cystic Fibrosis, Spinal Muscular Atrophy, Fragile X:  for cancer markers and all negative  Hemoglobinopathies: Not previously tested     FAMILY LINEAGE AND HISTORY:  Ashkenazi or North American Cheondoism:  No  Intellectual disability:  No  Premature ovarian failure (<40yoa):  No  Cajun or Nigerien [place]:  Yes - way back     MISCELLANEOUS:  Does the patient have cats? Yes - counseled    MEDICATIONS:  Current Outpatient Medications   Medication Instructions    dextroamphetamine-amphetamine (ADDERALL) 20 mg tablet 20 mg       --------------------------------------------------------------------------------     ASSESMENT & PLAN:  Pregnancy confirmed today with a positive pregnancy test.  Patient's medical history was obtained today.    A first trimester dating ultrasound was ordered and scheduled (ideally done between 8 and 10wks).  Gave patient our OB Welcome Packet and reviewed its contents.  Our discussion included:  an overview of  "appointments, hydration / nutrition / weight gain advice, safe OTC medications, what substances and exposures to avoid, vaccine recommendations, advice for morning sickness, dental hygiene advice, recommendations regarding exercise, advice for travel in pregnancy, information about breastfeeding, postpartum birth control, and circumcision, pediatricians in the community, WIC enrollment, how to contact us for concerns or problems during pregnancy, warning or danger signs.  Also provided [facility]'s publication, "Your Pregnancy from A-Z."    Advised patient to enroll in [email or web address] if not already done.    Medication management:.  Advised patient to start a prenatal vitamin if not already taking.  It should contain 600mcg folic acid, 27mg iron, and 200mg DHA.     Genetic and Carrier Screening options were discussed in detail with the patient.  Once her EDC is confirmed, she may go to [facility] for the test(s) if desired @ 9wks or greater.  She was encouraged to review the detailed information available in the OB Welcome Packet.    The patient was directed to the lab for  Routine OB labs    PAP smear: up to date    SAB precautions reviewed    Timing of next clinic appointment will be determined by dating ultrasound.  Will send patient a message in [email or web address].    Janelle Mascorro NP     LABS   INITIAL LABS:    Use LNOB (for Epic auto-fill)  Use LLWOBLABS (for manual entry) OTHER LABS:    Use L28W (for Epic auto-fill)  Use LLWOBLABS (for manual entry)   ULTRASOUNDS   ANATOMY SCAN:    Use LLWOANATOMYSCAN      HISTORY   MEDICAL HISTORY:    Pre-pregnancy BMI = 20  ADHD  Childhood asthma SURGICAL HISTORY:    Removal foreign body from knee age 12       OBSTETRIC HISTORY   Month/Year Mode of Delivery EGA Wt. M/F Epidural Complications / Comments   G1                                               SOCIAL HISTORY:    Smoker: non-smoker  Alcohol: denies  Drugs: denies  Relationship:   Domestic Violence: no  Lives with: "   Education Level: Undergraduate Degree  Occupation: [employer] ;   Hinduism:   GYN HISTORY:    PAP'S: no h/o abnormals  LAST PAP: PAP neg / Date: 12/13/2022  STD Hx: denies  GENITAL HSV: no FAMILY HISTORY:    HTN: no  DIABETES: no  BLEEDING D/O: no  CLOTTING D/O: no  BIRTH DEFECTS: no  MENTAL DISABILITY: no  GYN CANCER: yes  BREAST CA: P Aunt x2, P Cousin  OVARIAN CA: M Aunt  MALIGNANT HYPERTHERMIA: no       Follow up for dating ultrasound after 8-9 wk EGA, 1st OB around 10-12 wk EGA.   Future Appointments   Date Time Provider Department Center   5/27/2025 10:00 AM ULTRASOUND, [facility] [place] [place] [facility] [place] [facility]   5/27/2025 11:10 AM LAB, [facility] LAB [place] [facility]   5/27/2025 11:20 AM LAB, [facility] LAB [place] [facility]   6/12/2025  3:00 PM Herber, Lauren YIP MD [facility] [place] [facility] [place] [facility]     In excessive of 30 minutes total time spent for evaluation and management services. Time included elements of the following: time spent preparing to see patient, obtaining and reviewing separately obtained history, exam, evaluation, counseling and education of patient/family member or care giver, documenting in the HMR, independently interpreting results and communication of results, coordination of care ordering medications, tests, or procedures, referral and communication to other health care professionals.

## 2025-05-28 LAB
C TRACH DNA SPEC QL NAA+PROBE: NOT DETECTED
CTGC SOURCE (OHS) ORD-325: NORMAL
N GONORRHOEA DNA UR QL NAA+PROBE: NOT DETECTED

## 2025-05-30 ENCOUNTER — APPOINTMENT (OUTPATIENT)
Dept: LAB | Facility: HOSPITAL | Age: 30
End: 2025-05-30
Attending: FAMILY MEDICINE
Payer: COMMERCIAL

## 2025-06-04 ENCOUNTER — TELEPHONE (OUTPATIENT)
Dept: OBSTETRICS AND GYNECOLOGY | Facility: CLINIC | Age: 30
End: 2025-06-04
Payer: COMMERCIAL

## 2025-06-04 NOTE — TELEPHONE ENCOUNTER
I received a call from Dr ODELL Saavedra today. She reports this patient is on 20 mg Adderall BID. She reports patient is contacting her for refills but she is not comfortable with this during pregnancy. She is asking if patient can continue this and if OB/GYN is willing to take this over in pregnancy.     Dr Craven note on 2025  29 y.o.  for establishing care, preconceptual counseling, family history of breast and ovarian cancers, and bilateral milky nipple discharge.     Basic fertility reviewed with the patient.  Discussed importance of PNV (info in AVS, already has one at home), optimizing health conditions (consider managing ADHD with diet/exercise as she sometimes does), medication management (Adderall not known to cause birth defects but can cause FGR), immunization status (will check Varicella and Rubella titers), and timing of intercourse plus use of BBT or home OPK's.  Written information provided and reviewed together.      I discussed with Dr Mckeon and the plan is to bring patient in to discuss further in office and possibly take over this prescription and refer the patient to [facility].

## 2025-06-04 NOTE — TELEPHONE ENCOUNTER
Please the call the patient to come in sooner than her initial OB visit to discuss ADHD management in pregnancy with Dr Craven or Dr Mckeon.

## 2025-06-09 ENCOUNTER — OFFICE VISIT (OUTPATIENT)
Dept: OBSTETRICS AND GYNECOLOGY | Facility: CLINIC | Age: 30
End: 2025-06-09
Payer: COMMERCIAL

## 2025-06-09 VITALS — DIASTOLIC BLOOD PRESSURE: 68 MMHG | SYSTOLIC BLOOD PRESSURE: 102 MMHG

## 2025-06-09 DIAGNOSIS — F90.8 OTHER SPECIFIED ATTENTION DEFICIT HYPERACTIVITY DISORDER (ADHD): ICD-10-CM

## 2025-06-09 DIAGNOSIS — Z3A.08 8 WEEKS GESTATION OF PREGNANCY: Primary | ICD-10-CM

## 2025-06-09 PROCEDURE — 99999 PR PBB SHADOW E&M-EST. PATIENT-LVL II: CPT | Mod: PBBFAC,,, | Performed by: OBSTETRICS & GYNECOLOGY

## 2025-06-09 RX ORDER — DEXTROAMPHETAMINE SACCHARATE, AMPHETAMINE ASPARTATE, DEXTROAMPHETAMINE SULFATE AND AMPHETAMINE SULFATE 5; 5; 5; 5 MG/1; MG/1; MG/1; MG/1
20 TABLET ORAL 2 TIMES DAILY
Qty: 60 TABLET | Refills: 0 | Status: SHIPPED | OUTPATIENT
Start: 2025-06-09 | End: 2025-07-09

## 2025-06-15 PROBLEM — F90.9 ADHD: Status: ACTIVE | Noted: 2025-06-15

## 2025-06-15 PROBLEM — Z71.83 ENCOUNTER FOR NONPROCREATIVE GENETIC COUNSELING: Status: RESOLVED | Noted: 2025-04-22 | Resolved: 2025-06-15

## 2025-06-27 ENCOUNTER — TELEPHONE (OUTPATIENT)
Dept: OBSTETRICS AND GYNECOLOGY | Facility: CLINIC | Age: 30
End: 2025-06-27
Payer: COMMERCIAL

## 2025-06-27 NOTE — TELEPHONE ENCOUNTER
Returned call to pt, appt rescheduled for 7/7/25 at 10am, pt notified there may be a wait and pt is okay with this.

## 2025-06-27 NOTE — TELEPHONE ENCOUNTER
Copied from CRM #[id number]. Topic: Appointments - Appointment Access  >> Jun 27, 2025 12:28 PM Madonna wrote:  Type:  Reschedule Appointment Request    Caller is requesting a sooner appointment.  Caller declined first available appointment listed below.  Caller will not accept being placed on the waitlist and is requesting a message be sent to doctor.  Name of Caller:Pt  When is the first available appointment?Aug 1  Symptoms:Pregnant   Would the patient rather a call back or a response via [facility]? Call  Best Call Back Number:[phone]   Additional Information:   Pt needs to reschedule she will be gone all week next week.

## 2025-06-30 ENCOUNTER — TELEPHONE (OUTPATIENT)
Dept: OBSTETRICS AND GYNECOLOGY | Facility: CLINIC | Age: 30
End: 2025-06-30
Payer: COMMERCIAL

## 2025-06-30 NOTE — TELEPHONE ENCOUNTER
Left message for pt to call back regarding appt scheduled on 7/7/25, will offer 7/8/25 at 10am instead with Dr. Craven. Will send [employer] message.

## 2025-07-07 NOTE — PROGRESS NOTES
Done: Declined or N/A: Date /  Notes:   Depression Screen (20wks) []     Offered classes at hospital (20wks) []      Consent for delivery (24wks) []      Circumcision Consent []  [x]  Girl!   TDAP (28wks) []  []     Rhogam (28wks) []  []  O NEG   Flu shot (OCT-MAY) []  []     RSV shot (32+0 - 36+6wks and SEP-JAN) []  []     GBS collected (36wks) []      Pre-registered []      Birth Certificate Info Given []      Pediatrician selected []

## 2025-07-08 ENCOUNTER — INITIAL PRENATAL (OUTPATIENT)
Dept: OBSTETRICS AND GYNECOLOGY | Facility: CLINIC | Age: 30
End: 2025-07-08
Payer: COMMERCIAL

## 2025-07-08 VITALS
DIASTOLIC BLOOD PRESSURE: 60 MMHG | HEART RATE: 114 BPM | SYSTOLIC BLOOD PRESSURE: 100 MMHG | BODY MASS INDEX: 21.92 KG/M2 | WEIGHT: 131.75 LBS

## 2025-07-08 DIAGNOSIS — O09.90 SUPERVISION OF HIGH RISK PREGNANCY, ANTEPARTUM: Primary | ICD-10-CM

## 2025-07-08 PROCEDURE — 99999 PR PBB SHADOW E&M-EST. PATIENT-LVL II: CPT | Mod: PBBFAC,,, | Performed by: GENERAL PRACTICE

## 2025-07-08 PROCEDURE — 0500F INITIAL PRENATAL CARE VISIT: CPT | Mod: CPTII,S$GLB,, | Performed by: GENERAL PRACTICE

## 2025-07-08 NOTE — PROGRESS NOTES
No: Yes:  Notes:   Vaginal bleeding: [x] []     Concern for ROM / leaking fluids: [x]  []     Pain, cramps, contractions: [x]  []     Normal fetal movement: [x]  []  [] n/a   Other questions or concerns: [x]  []

## 2025-07-08 NOTE — PROGRESS NOTES
"Mindy Diez  29 y.o.   Baby: "Jodi"    Circumcision: Y N N/A   Name: Anders GBS: ___ (___)  Infant feeding: ___  Delivery Plan: ___   2026    = FINAL EDC by 6w3d US on  Pre-pregnancy BMI = 21  Ht: 5'5" / Wt: 123lbs Review of patient's allergies indicates:  No Known Allergies      DATE EGA WT #  WT KG BP FM FUNDAL HT FHR CVX LIE NEXT VISIT PROV NOTES   2025  128  58        CG    2025  12w3d  131  40 100/60 - - 171 US - - 16w LLW NOB appointment. Discussed Rh status and Varicella NI implications.                                                                                                                                                                                           OB PROBLEM LIST: MEDICATIONS:   Asthma (childhood only)  Rh Negative  Varicella NI  Adderall use in pregnancy    [  ] growth US T3 Current Outpatient Medications   Medication Instructions    dextroamphetamine-amphetamine (ADDERALL) 20 mg tablet 20 mg, Oral, 2 times daily    prenatal vit/iron fum/folic ac (PRENATAL 1+1 ORAL) Take by mouth.        NOTES:    2025        ANATOMY SCAN: OTHER ULTRASOUNDS: OTHER (EKG, echo, consultant notes):   Use OANATOMYSCAN Use OGROWTH for growth US      INITIAL LABS: 28 WK LABS: GENETIC AND OTHER LABS:   Lab Results   Component Value Date    GROUPTRH O NEG 2025    INDIRECTCOOM NEG 2025    WBC 7.88 2025    HGB 13.2 2025    HCT 38.8 2025     2025    MCV 88 2025    HEPBSAG Non-Reactive 2025    HEPCAB Non-Reactive 2025    TREPABIGMIGG Non-Reactive 2025    [id number] Non-Reactive 2025    RUBELLAIGG 31.2 2025    RUBELLAIMMUN Reactive 2025    VARICELLAZOS 0.246 2025    VARICELLAINT Negative 2025    HGBELP Normal 2025    LABURIN No Growth 2025    LABCHLA Not Detected 2025    LABNGO Not Detected 2025    Use L28W (for Epic auto-fill)   Genetic tests " PENDING in Epic, but patient received (negative per patient) results in [facility] patient portal     MEDICAL HISTORY   OBSTETRIC HISTORY   Month/Year Mode of Delivery EGA Wt. M/F Epidural Complications / Comments   G1                                               MEDICAL HISTORY:  Past Medical History:   Diagnosis Date    ADHD     Childhood asthma     SURGICAL HISTORY:  Past Surgical History:   Procedure Laterality Date    REMOVAL, FOREIGN BODY      from knee age 12         SOCIAL HISTORY:    Smoker: non-smoker  Alcohol: yes but not since +HCG  Drugs: denies  Relationship:  4/1/22  Domestic Violence: no  Lives with:  + friend  Education Level: Undergraduate Degree  Occupation: ,[employer]   Quaker: Catholic GYN HISTORY:    PAP'S: no h/o abnormals  LAST PAP: PAP neg / HPV neg / Date: 12/13/2022  STD Hx: denies  GENITAL HSV: no FAMILY HISTORY:    HTN: no  DIABETES: no  BLEEDING D/O: no  CLOTTING D/O: no  BIRTH DEFECTS: no  MENTAL DISABILITY: no  GYN CANCER: breast and ovarian (Mindy had neg genetic testing - see 5/5/25)  MALIGNANT HYPERTHERMIA: no

## 2025-07-08 NOTE — PATIENT INSTRUCTIONS
Have a question or concern?    PRO TIP: Program these phone numbers in your cell phone!    for an emergency  call 911 or go to the nearest hospital Especially after 20 weeks of the pregnancy, please remember that  Labor & Delivery is at [facility].  There is no L&D at [facility]   [facility] Nurse Care Advice Line  [phone] At any time during your pregnancy,  you can speak to a nurse 24-7.   for non-urgent issues, send us a  message in [place] Consider calling the Nurse Care Advice Line if it's a weekend or  toward the end of the work-day since  [place] and phone messages may not be answered for a day or two.   for non-urgent issues, call the clinic  [phone], Option 3    Labor & Delivery  [phone] Starting at 20 weeks of the pregnancy,  you can speak to a nurse on L&D 24-7.       FIRST TRIMESTER  0 - 13+6 weeks    Adapting to Pregnancy: First Trimester   As your body adjusts, you may have to change or limit your daily activities. You'll need more rest. You may also need to use the energy you have more wisely.     Your Changing Body   Almost every part of your body is affected as you adapt to pregnancy. You may not look very pregnant during the first three months, but you are likely to have some common signs of early pregnancy: Nausea, Fatigue, Frequent urination, Mood swings, Bloating of the abdomen, Nipple or breast tenderness, Breast swelling.    It's Not Too Late to Start Good Habits   What matters most is protecting your baby from this moment on. If you smoke, drink alcohol, or use drugs, now is the time to stop. If you need help, talk with your health care provider.   Smoking increases the risk of miscarriage or having a low-birth-weight baby. If you smoke, quit now.   Alcohol and drugs like marijuana have been linked to miscarriage, birth defects, intellectual disability, and low birth weight. Do not drink alcohol or use drugs.    Tips to Relieve Nausea    There's lots more information in your OB Welcome Packet, but here are a few ideas:  Eat small, light meals at frequent intervals.   Get up slowly. Eat a few unsalted crackers before you get out of bed.   Drink water with lemon slices.   Eat an ice pop in your favorite flavor.   Ask your health care provider about taking jeremy or vitamin B6 for nausea and vomiting.   Talk with your health care provider if you take vitamins that upset your stomach.    Advice for Travel   Talk to your health care provider first, but the second trimester may be the best time for travel. You may be advised to avoid certain trips while you're pregnant. Food and water can be concerns in developing countries. Travel by car is a good choice since you can stop, get out, and stretch (should be done at least every 2 hours). Bring snacks and water along. Fasten the lap belt below your belly, low over your hips. Also be sure to wear the shoulder harness.   We usually recommend no flying beyond 35 completed weeks (35+6).    Intimacy   Unless your health care provider tells you to, there is no reason to stop having sex while you're pregnant. You or your partner may notice changes in desire. Desire may be less in the first trimester, due to nausea and fatigue. In the second trimester, sex may be very enjoyable. The third trimester can be a challenge comfort-wise. Try different positions and see what's best for you.    Baby!  Major organs and nervous system are developing, the heart starts beating, lungs begin development, bones appear, all the little parts start to form (head, face, eyes, ears, arms, fingers, legs, and toes), hair starts to grow, and 20 tooth-buds develop.      OTHER INFORMATION:  If your provider orders labs or other studies, you probably won't hear from us unless something is abnormal.  How we define normal is different for many things in pregnancy.  This includes several of the numbers on a Complete Blood Count (CBC).  If  your result is flagged as abnormal in [facility] but you don't hear from us, it's probably because things are actually normal based on where you are in your pregnancy.

## 2025-07-14 DIAGNOSIS — F90.8 OTHER SPECIFIED ATTENTION DEFICIT HYPERACTIVITY DISORDER (ADHD): ICD-10-CM

## 2025-07-14 RX ORDER — DEXTROAMPHETAMINE SACCHARATE, AMPHETAMINE ASPARTATE, DEXTROAMPHETAMINE SULFATE AND AMPHETAMINE SULFATE 5; 5; 5; 5 MG/1; MG/1; MG/1; MG/1
20 TABLET ORAL 2 TIMES DAILY
Qty: 60 TABLET | Refills: 0 | Status: SHIPPED | OUTPATIENT
Start: 2025-07-14 | End: 2025-08-13

## 2025-08-02 ENCOUNTER — PATIENT MESSAGE (OUTPATIENT)
Dept: OTHER | Facility: OTHER | Age: 30
End: 2025-08-02
Payer: COMMERCIAL

## 2025-08-06 ENCOUNTER — ROUTINE PRENATAL (OUTPATIENT)
Dept: OBSTETRICS AND GYNECOLOGY | Facility: CLINIC | Age: 30
End: 2025-08-06
Payer: COMMERCIAL

## 2025-08-06 VITALS
HEART RATE: 108 BPM | BODY MASS INDEX: 22.42 KG/M2 | DIASTOLIC BLOOD PRESSURE: 60 MMHG | SYSTOLIC BLOOD PRESSURE: 110 MMHG | WEIGHT: 134.69 LBS

## 2025-08-06 DIAGNOSIS — Z3A.16 16 WEEKS GESTATION OF PREGNANCY: Primary | ICD-10-CM

## 2025-08-06 PROCEDURE — 99999 PR PBB SHADOW E&M-EST. PATIENT-LVL III: CPT | Mod: PBBFAC,,, | Performed by: OBSTETRICS & GYNECOLOGY

## 2025-08-06 NOTE — PROGRESS NOTES
"  Prenatal Note   Chief Complaint:  Routine Prenatal Visit       Patient ID: Mindy Diez is a  29 y.o. female.    Date: 2025    Mindy Diez  29 y.o.   Baby: "Jodi"    Circumcision: Y N N/A   Name: Anders GBS: ___ (___)  Infant feeding: ___  Delivery Plan: ___   2026    = FINAL EDC by 6w3d US on  Pre-pregnancy BMI = 21  Ht: 5'5" / Wt: 123lbs Review of patient's allergies indicates:  No Known Allergies      DATE EGA WT #  WT KG BP FM FUNDAL HT FHR CVX LIE NEXT VISIT PROV NOTES   2025  128  58        CG     12w3d  131  40 100/60 - - 171 US - - 16w LLW NOB appointment. Discussed Rh status and Varicella NI implications.    16+4 134   +6 lb 110/60 + Vscan 16 +  Vscan _ _ 4 w SP Patient currently doing well.  No OB complaints.    Plans for fetal anatomy ultrasound discussed.                                                                                                                                                                             OB PROBLEM LIST: MEDICATIONS:   Asthma (childhood only)  Rh Negative  Varicella NI  Adderall use in pregnancy    [  ] growth US T3 Current Outpatient Medications   Medication Instructions    dextroamphetamine-amphetamine (ADDERALL) 20 mg tablet 20 mg, Oral, 2 times daily    prenatal vit/iron fum/folic ac (PRENATAL 1+1 ORAL) Take by mouth.        NOTES:    The patient is currently doing well without complaints.    Her aneuploidy screening results are available to the patient on her phone and note low risk XX.  Results still not an epic chart.  We will contact [facility] in regards to this matter.      Patient's questions regarding the above reviewed and discussed.       ANATOMY SCAN: OTHER ULTRASOUNDS: OTHER (EKG, echo, consultant notes):   Use OANATOMYSCAN Use OGROWTH for growth US      INITIAL LABS: 28 WK LABS: GENETIC AND OTHER LABS:   Lab Results   Component Value Date    GROUPTRH O NEG 2025    INDIRECTCOOM NEG " 05/30/2025    WBC 7.88 05/30/2025    HGB 13.2 05/30/2025    HCT 38.8 05/30/2025     05/30/2025    MCV 88 05/30/2025    HEPBSAG Non-Reactive 05/30/2025    HEPCAB Non-Reactive 05/30/2025    TREPABIGMIGG Non-Reactive 05/30/2025    [id number] Non-Reactive 05/30/2025    RUBELLAIGG 31.2 04/04/2025    RUBELLAIMMUN Reactive 04/04/2025    VARICELLAZOS 0.246 04/04/2025    VARICELLAINT Negative 04/04/2025    HGBELP Normal 05/30/2025    LABURIN No Growth 05/30/2025    LABCHLA Not Detected 05/27/2025    LABNGO Not Detected 05/27/2025    Use L28W (for Epic auto-fill)   Carrier screening: Negative   Aneuploidy screening (still not in patient's epic chart) on patient's phone reported as low risk, XX     MEDICAL HISTORY   OBSTETRIC HISTORY   Month/Year Mode of Delivery EGA Wt. M/F Epidural Complications / Comments   G1                                               MEDICAL HISTORY:  Past Medical History:   Diagnosis Date    ADHD     Childhood asthma     SURGICAL HISTORY:  Past Surgical History:   Procedure Laterality Date    REMOVAL, FOREIGN BODY      from knee age 12         SOCIAL HISTORY:    Smoker: non-smoker  Alcohol: yes but not since +HCG  Drugs: denies  Relationship:  4/1/22  Domestic Violence: no  Lives with:  + friend  Education Level: Undergraduate Degree  Occupation: ,[employer]   Yarsanism: Quaker GYN HISTORY:    PAP'S: no h/o abnormals  LAST PAP: PAP neg / HPV neg / Date: 12/13/2022  STD Hx: denies  GENITAL HSV: no FAMILY HISTORY:    HTN: no  DIABETES: no  BLEEDING D/O: no  CLOTTING D/O: no  BIRTH DEFECTS: no  MENTAL DISABILITY: no  GYN CANCER: breast and ovarian (Mindy had neg genetic testing - see 5/5/25)  MALIGNANT HYPERTHERMIA: no         Plan:      16 weeks gestation of pregnancy  -     US OB/GYN Procedure ([facility]) - Extended List; Standing       Follow up in about 4 weeks (around 9/3/2025) for Routine OB F/U or as needed.     Kelvin Mckeon MD  Department  OBGYN [facility]

## 2025-08-08 NOTE — PATIENT INSTRUCTIONS
To schedule classes (see below for what's offered) at the hospital, call [phone].    Have a question or concern?    PRO TIP: Program these phone numbers in your cell phone!    for an emergency  call 911 or go to the nearest hospital Especially after 20 weeks of the pregnancy, please remember that  Labor & Delivery is at [facility].  There is no L&D at [facility] (formerly called [facility]).  After hours you can only access L&D through the Emergency Room (entrance on [facility]).   [facility] Nurse Care Advice Line  [phone] At any time during your pregnancy,  you can speak to a nurse 24-7.   for non-urgent issues, send us a  message in [employer] Consider calling the Nurse Care Advice Line if it's a weekend or  toward the end of the work-day since  [employer] and phone messages may not be answered for a day or two.   for non-urgent issues, call the clinic  [phone], Option 3    Labor & Delivery  [phone] Starting at 20 weeks of the pregnancy,  you can speak to a nurse on L&D 24-7.     SECOND TRIMESTER  14+0 - 28+6 weeks    Adapting to Pregnancy: Second Trimester   Keep up the healthy habits you started in your first trimester.  It's not too late to make better choices and drop bad habits - your baby's counting on you!  Most women enjoy this middle part of the pregnancy: first trimester fatigue and morning sickness are probably behind you, and your belly's not yet getting in the way physically.    Your To-Do List  There are several things you should start working on.  More information on these topics can be found in your OB Welcome Packet and the A-Z Book.    Choose a pediatrician for your baby.  Sign up for a tour and classes at the hospital.  All classes are free of charge if you are delivering at [facility].  Call [phone] to register for any of these classes:  Baby Love (learn about the delivery process and caring for a )  Big Brother / Big Sister Class (to help siblings prepare for  baby)  Lamaze (a 4 week class to learn about natural interventions for labor)  Breastfeeding (get a head start learning about breastfeeding)  Work on some methods for coping with the pains of labor.  A good bit of labor happens before you can get an epidural, and you'll feel more confident if you have a plan that you've practiced.  We encourage everyone to breastfeed if they can (and most women can!).  Please ask us questions if you have them.  Decide what you'd like to use for contraception (birth control) after this baby is born.  If you're having a boy, let us know if you plan to have him circumcised.    Pregnancy Milestones  After week 16, avoid lying on your back for more than a few minutes. Instead, lie on your side and switch sides often.  Between 19 and 22 weeks you'll have an ultrasound to look at the baby's anatomy and determine the gender (if you want to know and don't already know). This is around the same time when you should start feeling baby's movements and kicks.  Your gestational diabetes test will be done around 28 weeks.  We'll give you a TDAP booster shot so that your baby is protected from Whooping Cough (pertussis) his/her first few months of life.    When You Travel   The second trimester is a good time for travel. Talk to your health care provider about any special plans you may need to make. Always:   Wear a seat belt. Fasten the lap part under your belly. Wear the shoulder part also.   Take frequent breaks during long trips by car or plane. Move around to stretch your legs.   Drink plenty of fluids on flights. The air in plane cabins is very dry.   Avoid hot climates or high altitudes if you are not used to them.   Avoid places where the food and water might make you sick.    Intimacy  Unless your health care provider tells you otherwise, it's perfectly fine to continue having sex. In fact, many women find sex in the second trimester quite enjoyable due to increased blood supply to the  pelvic area.    Baby!  Organs continue to develop and begin to function, there's formation of eyebrows / eyelashes / fingernails, skin is wrinkled and covered in vernix, genitals develop, a fine hair called lanugo covers the body, and baby is busy: kicking, sleeping, swallowing amniotic fluid, listening to you, passing urine, and sucking those thumbs.    OTHER INFORMATION:  If your provider orders labs or other studies, you probably won't hear from us unless something is abnormal.  How we define normal is different for many things in pregnancy.  This includes several of the numbers on a Complete Blood Count (CBC).  If your result is flagged as abnormal in [employer] but you don't hear from us, it's probably because things are actually normal based on where you are in your pregnancy.

## 2025-08-09 ENCOUNTER — PATIENT MESSAGE (OUTPATIENT)
Dept: OTHER | Facility: OTHER | Age: 30
End: 2025-08-09
Payer: COMMERCIAL

## 2025-08-09 DIAGNOSIS — F90.8 OTHER SPECIFIED ATTENTION DEFICIT HYPERACTIVITY DISORDER (ADHD): ICD-10-CM

## 2025-08-11 RX ORDER — DEXTROAMPHETAMINE SACCHARATE, AMPHETAMINE ASPARTATE, DEXTROAMPHETAMINE SULFATE AND AMPHETAMINE SULFATE 5; 5; 5; 5 MG/1; MG/1; MG/1; MG/1
20 TABLET ORAL 2 TIMES DAILY
Qty: 60 TABLET | Refills: 0 | Status: SHIPPED | OUTPATIENT
Start: 2025-08-11 | End: 2025-09-10

## 2025-08-12 ENCOUNTER — OFFICE VISIT (OUTPATIENT)
Dept: URGENT CARE | Facility: CLINIC | Age: 30
End: 2025-08-12
Payer: COMMERCIAL

## 2025-08-12 VITALS
BODY MASS INDEX: 22.33 KG/M2 | SYSTOLIC BLOOD PRESSURE: 107 MMHG | TEMPERATURE: 99 F | OXYGEN SATURATION: 100 % | HEART RATE: 102 BPM | DIASTOLIC BLOOD PRESSURE: 69 MMHG | HEIGHT: 65 IN | RESPIRATION RATE: 16 BRPM | WEIGHT: 134 LBS

## 2025-08-12 DIAGNOSIS — Z3A.17 17 WEEKS GESTATION OF PREGNANCY: ICD-10-CM

## 2025-08-12 DIAGNOSIS — J02.9 ACUTE VIRAL PHARYNGITIS: ICD-10-CM

## 2025-08-12 DIAGNOSIS — J02.9 SORE THROAT: Primary | ICD-10-CM

## 2025-08-12 PROBLEM — J45.901 MILD ASTHMA EXACERBATION: Status: ACTIVE | Noted: 2024-12-03

## 2025-08-12 LAB
CTP QC/QA: YES
FLUAV AG NPH QL: NEGATIVE
FLUBV AG NPH QL: NEGATIVE
S PYO RRNA THROAT QL PROBE: NEGATIVE
SARS-COV+SARS-COV-2 AG RESP QL IA.RAPID: NEGATIVE

## 2025-08-12 PROCEDURE — 87811 SARS-COV-2 COVID19 W/OPTIC: CPT | Mod: QW,S$GLB,, | Performed by: NURSE PRACTITIONER

## 2025-08-12 PROCEDURE — 99214 OFFICE O/P EST MOD 30 MIN: CPT | Mod: S$GLB,,, | Performed by: NURSE PRACTITIONER

## 2025-08-12 PROCEDURE — 87804 INFLUENZA ASSAY W/OPTIC: CPT | Mod: QW,,, | Performed by: NURSE PRACTITIONER

## 2025-08-12 PROCEDURE — 87880 STREP A ASSAY W/OPTIC: CPT | Mod: QW,,, | Performed by: NURSE PRACTITIONER

## 2025-09-02 ENCOUNTER — HOSPITAL ENCOUNTER (OUTPATIENT)
Dept: OBSTETRICS AND GYNECOLOGY | Facility: CLINIC | Age: 30
Discharge: HOME | End: 2025-09-02
Attending: OBSTETRICS & GYNECOLOGY
Payer: COMMERCIAL

## 2025-09-02 DIAGNOSIS — Z3A.16 16 WEEKS GESTATION OF PREGNANCY: ICD-10-CM

## 2025-09-02 PROCEDURE — 76805 OB US >/= 14 WKS SNGL FETUS: CPT | Mod: 26,,, | Performed by: OBSTETRICS & GYNECOLOGY

## 2025-09-03 ENCOUNTER — ROUTINE PRENATAL (OUTPATIENT)
Dept: OBSTETRICS AND GYNECOLOGY | Facility: CLINIC | Age: 30
End: 2025-09-03
Payer: COMMERCIAL

## 2025-09-03 VITALS
SYSTOLIC BLOOD PRESSURE: 112 MMHG | BODY MASS INDEX: 23.66 KG/M2 | DIASTOLIC BLOOD PRESSURE: 60 MMHG | HEART RATE: 108 BPM | WEIGHT: 142.19 LBS

## 2025-09-03 DIAGNOSIS — Z3A.20 20 WEEKS GESTATION OF PREGNANCY: Primary | ICD-10-CM

## 2025-09-03 DIAGNOSIS — Z36.2 ENCOUNTER FOR FOLLOW-UP ULTRASOUND OF FETAL ANATOMY: ICD-10-CM

## 2025-09-03 PROCEDURE — 99999 PR PBB SHADOW E&M-EST. PATIENT-LVL II: CPT | Mod: PBBFAC,,, | Performed by: OBSTETRICS & GYNECOLOGY
